# Patient Record
Sex: FEMALE | Race: WHITE | NOT HISPANIC OR LATINO | URBAN - NONMETROPOLITAN AREA
[De-identification: names, ages, dates, MRNs, and addresses within clinical notes are randomized per-mention and may not be internally consistent; named-entity substitution may affect disease eponyms.]

---

## 2017-11-21 ENCOUNTER — TRANSCRIBE ORDERS (OUTPATIENT)
Dept: ADMINISTRATIVE | Facility: HOSPITAL | Age: 67
End: 2017-11-21

## 2017-11-21 DIAGNOSIS — Z78.0 POST-MENOPAUSAL: Primary | ICD-10-CM

## 2017-11-27 ENCOUNTER — TRANSCRIBE ORDERS (OUTPATIENT)
Dept: GENERAL RADIOLOGY | Facility: HOSPITAL | Age: 67
End: 2017-11-27

## 2021-11-18 ENCOUNTER — TRANSCRIBE ORDERS (OUTPATIENT)
Dept: ADMINISTRATIVE | Facility: HOSPITAL | Age: 71
End: 2021-11-18

## 2021-11-18 DIAGNOSIS — Z12.31 ENCOUNTER FOR SCREENING MAMMOGRAM FOR MALIGNANT NEOPLASM OF BREAST: Primary | ICD-10-CM

## 2022-01-18 ENCOUNTER — HOSPITAL ENCOUNTER (OUTPATIENT)
Dept: MAMMOGRAPHY | Facility: HOSPITAL | Age: 72
Discharge: HOME | End: 2022-01-18
Admitting: FAMILY MEDICINE

## 2022-01-18 DIAGNOSIS — Z12.31 ENCOUNTER FOR SCREENING MAMMOGRAM FOR MALIGNANT NEOPLASM OF BREAST: ICD-10-CM

## 2022-01-18 PROCEDURE — 77067 SCR MAMMO BI INCL CAD: CPT

## 2022-01-18 PROCEDURE — 77063 BREAST TOMOSYNTHESIS BI: CPT

## 2022-06-07 ENCOUNTER — HOSPITAL ENCOUNTER (OUTPATIENT)
Dept: GENERAL RADIOLOGY | Facility: HOSPITAL | Age: 72
Discharge: HOME | End: 2022-06-07
Admitting: FAMILY MEDICINE

## 2022-06-07 DIAGNOSIS — M79.672 FOOT PAIN, LEFT: ICD-10-CM

## 2022-06-07 PROCEDURE — 73630 X-RAY EXAM OF FOOT: CPT

## 2022-12-06 ENCOUNTER — TRANSCRIBE ORDERS (OUTPATIENT)
Dept: ADMINISTRATIVE | Facility: HOSPITAL | Age: 72
End: 2022-12-06

## 2022-12-06 DIAGNOSIS — Z78.0 POSTMENOPAUSAL STATE: Primary | ICD-10-CM

## 2023-02-04 ENCOUNTER — HOSPITAL ENCOUNTER (EMERGENCY)
Facility: HOSPITAL | Age: 73
Discharge: HOME | End: 2023-02-04
Attending: EMERGENCY MEDICINE | Admitting: EMERGENCY MEDICINE
Payer: MEDICARE

## 2023-02-04 ENCOUNTER — APPOINTMENT (OUTPATIENT)
Dept: CT IMAGING | Facility: HOSPITAL | Age: 73
End: 2023-02-04
Payer: MEDICARE

## 2023-02-04 ENCOUNTER — APPOINTMENT (OUTPATIENT)
Dept: GENERAL RADIOLOGY | Facility: HOSPITAL | Age: 73
End: 2023-02-04
Payer: MEDICARE

## 2023-02-04 VITALS
BODY MASS INDEX: 35.17 KG/M2 | DIASTOLIC BLOOD PRESSURE: 75 MMHG | WEIGHT: 206 LBS | HEIGHT: 64 IN | OXYGEN SATURATION: 99 % | TEMPERATURE: 98 F | SYSTOLIC BLOOD PRESSURE: 172 MMHG | RESPIRATION RATE: 20 BRPM | HEART RATE: 86 BPM

## 2023-02-04 DIAGNOSIS — R11.2 NAUSEA AND VOMITING, UNSPECIFIED VOMITING TYPE: ICD-10-CM

## 2023-02-04 DIAGNOSIS — R42 VERTIGO: Primary | ICD-10-CM

## 2023-02-04 LAB
ALBUMIN SERPL-MCNC: 4 G/DL (ref 3.5–5.2)
ALBUMIN/GLOB SERPL: 1.3 G/DL
ALP SERPL-CCNC: 72 U/L (ref 39–117)
ALT SERPL W P-5'-P-CCNC: 16 U/L (ref 1–33)
ANION GAP SERPL CALCULATED.3IONS-SCNC: 9.8 MMOL/L (ref 5–15)
AST SERPL-CCNC: 15 U/L (ref 1–32)
BASOPHILS # BLD AUTO: 0.11 10*3/MM3 (ref 0–0.2)
BASOPHILS NFR BLD AUTO: 0.9 % (ref 0–1.5)
BILIRUB SERPL-MCNC: 0.6 MG/DL (ref 0–1.2)
BILIRUB UR QL STRIP: NEGATIVE
BUN SERPL-MCNC: 12 MG/DL (ref 8–23)
BUN/CREAT SERPL: 17.9 (ref 7–25)
CALCIUM SPEC-SCNC: 9.8 MG/DL (ref 8.6–10.5)
CHLORIDE SERPL-SCNC: 103 MMOL/L (ref 98–107)
CLARITY UR: CLEAR
CO2 SERPL-SCNC: 29.2 MMOL/L (ref 22–29)
COLOR UR: YELLOW
CREAT SERPL-MCNC: 0.67 MG/DL (ref 0.57–1)
DEPRECATED RDW RBC AUTO: 40.4 FL (ref 37–54)
EGFRCR SERPLBLD CKD-EPI 2021: 93.6 ML/MIN/1.73
EOSINOPHIL # BLD AUTO: 0.13 10*3/MM3 (ref 0–0.4)
EOSINOPHIL NFR BLD AUTO: 1.1 % (ref 0.3–6.2)
ERYTHROCYTE [DISTWIDTH] IN BLOOD BY AUTOMATED COUNT: 12.3 % (ref 12.3–15.4)
GLOBULIN UR ELPH-MCNC: 3.1 GM/DL
GLUCOSE SERPL-MCNC: 105 MG/DL (ref 65–99)
GLUCOSE UR STRIP-MCNC: NEGATIVE MG/DL
HCT VFR BLD AUTO: 45 % (ref 34–46.6)
HGB BLD-MCNC: 15.1 G/DL (ref 12–15.9)
HGB UR QL STRIP.AUTO: NEGATIVE
HOLD SPECIMEN: NORMAL
HOLD SPECIMEN: NORMAL
IMM GRANULOCYTES # BLD AUTO: 0.09 10*3/MM3 (ref 0–0.05)
IMM GRANULOCYTES NFR BLD AUTO: 0.7 % (ref 0–0.5)
KETONES UR QL STRIP: ABNORMAL
LEUKOCYTE ESTERASE UR QL STRIP.AUTO: NEGATIVE
LYMPHOCYTES # BLD AUTO: 2.88 10*3/MM3 (ref 0.7–3.1)
LYMPHOCYTES NFR BLD AUTO: 23.7 % (ref 19.6–45.3)
MAGNESIUM SERPL-MCNC: 2.2 MG/DL (ref 1.6–2.4)
MCH RBC QN AUTO: 29.7 PG (ref 26.6–33)
MCHC RBC AUTO-ENTMCNC: 33.6 G/DL (ref 31.5–35.7)
MCV RBC AUTO: 88.6 FL (ref 79–97)
MONOCYTES # BLD AUTO: 0.64 10*3/MM3 (ref 0.1–0.9)
MONOCYTES NFR BLD AUTO: 5.3 % (ref 5–12)
NEUTROPHILS NFR BLD AUTO: 68.3 % (ref 42.7–76)
NEUTROPHILS NFR BLD AUTO: 8.28 10*3/MM3 (ref 1.7–7)
NITRITE UR QL STRIP: NEGATIVE
NRBC BLD AUTO-RTO: 0 /100 WBC (ref 0–0.2)
PH UR STRIP.AUTO: 6 [PH] (ref 5–8)
PLATELET # BLD AUTO: 301 10*3/MM3 (ref 140–450)
PMV BLD AUTO: 9.7 FL (ref 6–12)
POTASSIUM SERPL-SCNC: 4.2 MMOL/L (ref 3.5–5.2)
PROT SERPL-MCNC: 7.1 G/DL (ref 6–8.5)
PROT UR QL STRIP: NEGATIVE
RBC # BLD AUTO: 5.08 10*6/MM3 (ref 3.77–5.28)
SODIUM SERPL-SCNC: 142 MMOL/L (ref 136–145)
SP GR UR STRIP: 1.02 (ref 1–1.03)
TROPONIN T SERPL-MCNC: <0.01 NG/ML (ref 0–0.03)
UROBILINOGEN UR QL STRIP: ABNORMAL
WBC NRBC COR # BLD: 12.13 10*3/MM3 (ref 3.4–10.8)
WHOLE BLOOD HOLD COAG: NORMAL
WHOLE BLOOD HOLD SPECIMEN: NORMAL

## 2023-02-04 PROCEDURE — 71045 X-RAY EXAM CHEST 1 VIEW: CPT

## 2023-02-04 PROCEDURE — 80053 COMPREHEN METABOLIC PANEL: CPT

## 2023-02-04 PROCEDURE — 99284 EMERGENCY DEPT VISIT MOD MDM: CPT

## 2023-02-04 PROCEDURE — 96374 THER/PROPH/DIAG INJ IV PUSH: CPT

## 2023-02-04 PROCEDURE — 85025 COMPLETE CBC W/AUTO DIFF WBC: CPT

## 2023-02-04 PROCEDURE — 25010000002 METOCLOPRAMIDE PER 10 MG: Performed by: PHYSICIAN ASSISTANT

## 2023-02-04 PROCEDURE — 36415 COLL VENOUS BLD VENIPUNCTURE: CPT

## 2023-02-04 PROCEDURE — 84484 ASSAY OF TROPONIN QUANT: CPT

## 2023-02-04 PROCEDURE — 25010000002 DIPHENHYDRAMINE PER 50 MG: Performed by: PHYSICIAN ASSISTANT

## 2023-02-04 PROCEDURE — 83735 ASSAY OF MAGNESIUM: CPT

## 2023-02-04 PROCEDURE — 96375 TX/PRO/DX INJ NEW DRUG ADDON: CPT

## 2023-02-04 PROCEDURE — 81003 URINALYSIS AUTO W/O SCOPE: CPT

## 2023-02-04 PROCEDURE — 70450 CT HEAD/BRAIN W/O DYE: CPT

## 2023-02-04 PROCEDURE — 93005 ELECTROCARDIOGRAM TRACING: CPT

## 2023-02-04 RX ORDER — ONDANSETRON 2 MG/ML
4 INJECTION INTRAMUSCULAR; INTRAVENOUS ONCE
Status: DISCONTINUED | OUTPATIENT
Start: 2023-02-04 | End: 2023-02-04

## 2023-02-04 RX ORDER — METOCLOPRAMIDE HYDROCHLORIDE 5 MG/ML
10 INJECTION INTRAMUSCULAR; INTRAVENOUS ONCE
Status: COMPLETED | OUTPATIENT
Start: 2023-02-04 | End: 2023-02-04

## 2023-02-04 RX ORDER — DIAZEPAM 5 MG/ML
2.5 INJECTION, SOLUTION INTRAMUSCULAR; INTRAVENOUS EVERY 4 HOURS PRN
Status: DISCONTINUED | OUTPATIENT
Start: 2023-02-04 | End: 2023-02-04

## 2023-02-04 RX ORDER — MECLIZINE HYDROCHLORIDE 25 MG/1
25 TABLET ORAL 3 TIMES DAILY PRN
Qty: 9 TABLET | Refills: 0 | Status: SHIPPED | OUTPATIENT
Start: 2023-02-04

## 2023-02-04 RX ORDER — SCOLOPAMINE TRANSDERMAL SYSTEM 1 MG/1
1 PATCH, EXTENDED RELEASE TRANSDERMAL
Status: DISCONTINUED | OUTPATIENT
Start: 2023-02-04 | End: 2023-02-04 | Stop reason: HOSPADM

## 2023-02-04 RX ORDER — ONDANSETRON 4 MG/1
4 TABLET, ORALLY DISINTEGRATING ORAL EVERY 6 HOURS PRN
Qty: 8 TABLET | Refills: 0 | Status: SHIPPED | OUTPATIENT
Start: 2023-02-04 | End: 2023-02-06

## 2023-02-04 RX ORDER — SODIUM CHLORIDE 0.9 % (FLUSH) 0.9 %
10 SYRINGE (ML) INJECTION AS NEEDED
Status: DISCONTINUED | OUTPATIENT
Start: 2023-02-04 | End: 2023-02-04 | Stop reason: HOSPADM

## 2023-02-04 RX ORDER — DIPHENHYDRAMINE HYDROCHLORIDE 50 MG/ML
25 INJECTION INTRAMUSCULAR; INTRAVENOUS ONCE
Status: COMPLETED | OUTPATIENT
Start: 2023-02-04 | End: 2023-02-04

## 2023-02-04 RX ADMIN — DIPHENHYDRAMINE HYDROCHLORIDE 25 MG: 50 INJECTION, SOLUTION INTRAMUSCULAR; INTRAVENOUS at 18:29

## 2023-02-04 RX ADMIN — SCOPALAMINE 1 PATCH: 1 PATCH, EXTENDED RELEASE TRANSDERMAL at 18:26

## 2023-02-04 RX ADMIN — METOCLOPRAMIDE 10 MG: 5 INJECTION, SOLUTION INTRAMUSCULAR; INTRAVENOUS at 18:28

## 2023-02-04 RX ADMIN — SODIUM CHLORIDE 1000 ML: 9 INJECTION, SOLUTION INTRAVENOUS at 18:25

## 2023-02-04 NOTE — ED PROVIDER NOTES
Subjective  History of Present Illness:    Chief Complaint: Dizziness, syncope  History of Present Illness: Patient is a 73-year-old female with a history of MI, anemia, arthritis, asthma, fatigue, fibromyalgia, dizziness, and headache presenting to the ER for evaluation of dizziness, vomiting and likely syncopal episode.  Patient states that she began feeling very dizzy last night and upon waking this morning had vomiting with the dizziness.  She states the dizziness is only present when she moves her head.  She states she bent over to pet her dog this morning and everything went black.  She states she was able to get herself down to the floor but does think she may have lost consciousness for short amount of time.  Does not believe she hit her head.  She states upon awakening she threw up in the kitchen sink.  She denies any other recent head trauma.  She denies any fever, chills, cough, chest pain, shortness of breath, dizziness, syncope, abdominal pain, diarrhea, dysuria, hematuria, leg pain or swelling, extremity weakness, or any other symptoms.  Onset: Today  Duration: Intermittent  Exacerbating / Alleviating factors: Only present with head movements  Associated symptoms: Nausea, vomiting      Nurses Notes reviewed and agree, including vitals, allergies, social history and prior medical history.     REVIEW OF SYSTEMS: All systems reviewed and not pertinent unless noted.  Review of Systems      Positive for: Dizziness, nausea, vomiting    Negative for: Headache, fever, chills, vision loss, chest pain, cough, shortness of breath, abdominal pain, diarrhea, extremity weakness, difficulty speaking or swallowing    Past Medical History:   Diagnosis Date   • Acute myocardial infarction (HCC)     history of   • Anemia    • Arthritis    • Asthma    • Back pain     History of   • Difficulty walking     History of   • Fatigue    • Fibromyalgia    • H/O measles    • H/O prostate cancer    • History of diarrhea    •  "History of dizziness    • History of headache    • History of hemorrhoids    • History of hoarseness    • History of neck pain    • Joint pain     history of   • Limb pain     h/o   • Normal routine physical examination    • Pain in axilla     History of   • Rash     History of   • Seasonal allergic reaction      History of Complaint Of Allergic Reaction Seasonal   • Swelling of ankle joint        Allergies:    Doxycycline, Dust mite extract, Meloxicam, Other, Penicillins, Sulfa antibiotics, and Latex      Past Surgical History:   Procedure Laterality Date   • CHOLECYSTECTOMY     • KNEE SURGERY Bilateral    • WRIST SURGERY           Social History     Socioeconomic History   • Marital status:    Tobacco Use   • Smoking status: Never   • Smokeless tobacco: Never   Vaping Use   • Vaping Use: Never used   Substance and Sexual Activity   • Alcohol use: No   • Drug use: No   • Sexual activity: Defer         History reviewed. No pertinent family history.    Objective  Physical Exam:  BP (!) 185/84   Pulse 70   Temp 98 °F (36.7 °C) (Oral)   Resp 20   Ht 162.6 cm (64\")   Wt 93.4 kg (206 lb)   SpO2 99%   BMI 35.36 kg/m²      Physical Exam    CONSTITUTIONAL: Well developed, nontoxic appearing, no acute distress.  She is awake, alert, answering questions appropriately.  VITAL SIGNS: per nursing, reviewed and noted  SKIN: exposed skin with no rashes, ulcerations or petechiae  EYES: Grossly EOMI, no icterus, PERRL. No significant nystagmus  ENT: Normal voice.  Posterior pharynx unremarkable, uvula midline, tympanic membranes are clear bilaterally, no erythema or bulging  RESPIRATORY:  No increased work of breathing. No retractions.  Lung sounds are clear to auscultation bilaterally  CARDIOVASCULAR:  regular rate and rhythm. Good Peripheral pulses. Good cap refill to extremities.   GI: Abdomen soft, nontender, normal bowel sounds.  MUSCULOSKELETAL:  No tenderness. Full ROM. Strength and tone grossly normal. "   NEUROLOGIC: Alert, oriented x 3. No gross deficits.  Strength 5 out of 5 in upper and lower extremities.  GCS 15.  No obvious ataxia or dysmetria  PSYCH: appropriate affect.      Procedures    ED Course:        ED Course as of 02/04/23 1926   Sat Feb 04, 2023 1839 FINAL REPORT     TECHNIQUE:  Multiple axial CT images were performed from the foramen magnum  to the vertex. This study was performed with techniques to keep  radiation doses as low as reasonably achievable (ALARA).  Individualized dose reduction techniques using automated  exposure control or adjustment of mA and/or kV according to the  patient's size were employed.     CLINICAL HISTORY:  Dizziness, vomiting     FINDINGS:  No acute intracranial hemorrhage or large acute cortical  infarct.  The brain volume is normal for patient's age.  Ventricles are normal in size and configuration.  No midline  shift.  The basal cisterns are patent.  No skull fracture.  The  visualized paranasal sinuses and mastoid air cells are clear.     IMPRESSION:  No acute intracranial hemorrhage or large acute cortical infarct.     Authenticated and Electronically Signed by Manjit Weber MD on  02/04/2023 06:30:39 PM [LA]   1843 Color, UA: Yellow [LA]   1843 Appearance, UA: Clear [LA]   1843 pH, UA: 6.0 [LA]   1843 Specific Gravity, UA: 1.016 [LA]   1843 Glucose: Negative [LA]   1843 Ketones, UA(!): Trace [LA]   1843 Bilirubin, UA: Negative [LA]   1843 Blood, UA: Negative [LA]   1843 Protein, UA: Negative [LA]   1843 Leukocytes, UA: Negative [LA]   1843 Nitrite, UA: Negative [LA]   1843 Urobilinogen, UA: 0.2 E.U./dL [LA]   1846 Reviewed xray, do not see acute cardiopulmonary abnormalities.  [LA]   1908 Patient states she feels much better.  She states that she needs to leave and wants to go home at this time.  She does not want to get the rest of her IV fluid bolus.  She ambulated to the bathroom without difficulty, no ataxia or gait abnormalities..  We will call in  medications for her to use at home, give ENT follow-up.  Discussed very strict return precautions. [LA]      ED Course User Index  [LA] Alea Littlejohn PA-C       Lab Results (last 24 hours)     Procedure Component Value Units Date/Time    CBC & Differential [[id number]]  (Abnormal) Collected: 02/04/23 1724    Specimen: Blood Updated: 02/04/23 1731    Narrative:      The following orders were created for panel order CBC & Differential.  Procedure                               Abnormality         Status                     ---------                               -----------         ------                     CBC Auto Differential[[id number]]        Abnormal            Final result                 Please view results for these tests on the individual orders.    Comprehensive Metabolic Panel [[id number]]  (Abnormal) Collected: 02/04/23 1724    Specimen: Blood Updated: 02/04/23 1748     Glucose 105 mg/dL      BUN 12 mg/dL      Creatinine 0.67 mg/dL      Sodium 142 mmol/L      Potassium 4.2 mmol/L      Chloride 103 mmol/L      CO2 29.2 mmol/L      Calcium 9.8 mg/dL      Total Protein 7.1 g/dL      Albumin 4.0 g/dL      ALT (SGPT) 16 U/L      AST (SGOT) 15 U/L      Alkaline Phosphatase 72 U/L      Total Bilirubin 0.6 mg/dL      Globulin 3.1 gm/dL      A/G Ratio 1.3 g/dL      BUN/Creatinine Ratio 17.9     Anion Gap 9.8 mmol/L      eGFR 93.6 mL/min/1.73     Narrative:      GFR Normal >60  Chronic Kidney Disease <60  Kidney Failure <15    The GFR formula is only valid for adults with stable renal function between ages 18 and 70.    Troponin [[id number]]  (Normal) Collected: 02/04/23 1724    Specimen: Blood Updated: 02/04/23 1750     Troponin T <0.010 ng/mL     Narrative:      Troponin T Reference Range:  <= 0.03 ng/mL-   Negative for AMI  >0.03 ng/mL-     Abnormal for myocardial necrosis.  Clinicians would have to utilize clinical acumen, EKG, Troponin and serial changes to determine if it is an Acute Myocardial Infarction or  myocardial injury due to an underlying chronic condition.       Results may be falsely decreased if patient taking Biotin.      Magnesium [[id number]]  (Normal) Collected: 02/04/23 1724    Specimen: Blood Updated: 02/04/23 1748     Magnesium 2.2 mg/dL     CBC Auto Differential [[id number]]  (Abnormal) Collected: 02/04/23 1724    Specimen: Blood Updated: 02/04/23 1731     WBC 12.13 10*3/mm3      RBC 5.08 10*6/mm3      Hemoglobin 15.1 g/dL      Hematocrit 45.0 %      MCV 88.6 fL      MCH 29.7 pg      MCHC 33.6 g/dL      RDW 12.3 %      RDW-SD 40.4 fl      MPV 9.7 fL      Platelets 301 10*3/mm3      Neutrophil % 68.3 %      Lymphocyte % 23.7 %      Monocyte % 5.3 %      Eosinophil % 1.1 %      Basophil % 0.9 %      Immature Grans % 0.7 %      Neutrophils, Absolute 8.28 10*3/mm3      Lymphocytes, Absolute 2.88 10*3/mm3      Monocytes, Absolute 0.64 10*3/mm3      Eosinophils, Absolute 0.13 10*3/mm3      Basophils, Absolute 0.11 10*3/mm3      Immature Grans, Absolute 0.09 10*3/mm3      nRBC 0.0 /100 WBC     Urinalysis With Culture If Indicated - Urine, Clean Catch [[id number]]  (Abnormal) Collected: 02/04/23 1825    Specimen: Urine, Clean Catch Updated: 02/04/23 1840     Color, UA Yellow     Appearance, UA Clear     pH, UA 6.0     Specific Gravity, UA 1.016     Glucose, UA Negative     Ketones, UA Trace     Bilirubin, UA Negative     Blood, UA Negative     Protein, UA Negative     Leuk Esterase, UA Negative     Nitrite, UA Negative     Urobilinogen, UA 0.2 E.U./dL    Narrative:      In absence of clinical symptoms, the presence of pyuria, bacteria, and/or nitrites on the urinalysis result does not correlate with infection.  Urine microscopic not indicated.           CT Head Without Contrast    Result Date: 2/4/2023  FINAL REPORT TECHNIQUE: Multiple axial CT images were performed from the foramen magnum to the vertex. This study was performed with techniques to keep radiation doses as low as reasonably achievable (ALARA).  Individualized dose reduction techniques using automated exposure control or adjustment of mA and/or kV according to the patient's size were employed. CLINICAL HISTORY: Dizziness, vomiting FINDINGS: No acute intracranial hemorrhage or large acute cortical infarct.  The brain volume is normal for patient's age. Ventricles are normal in size and configuration.  No midline shift.  The basal cisterns are patent.  No skull fracture.  The visualized paranasal sinuses and mastoid air cells are clear.     Impression: No acute intracranial hemorrhage or large acute cortical infarct. Authenticated and Electronically Signed by Manjit Weber MD on 02/04/2023 06:30:39 PM         MDM    Initial impression of presenting illness: Patient is a 73-year-old female presenting to the ER for evaluation of dizziness and vomiting.  On arrival, patient has an elevated blood pressure but there is no tachycardia, she is afebrile.  She does not appear septic or toxic    DDX: includes but is not limited to: Benign positional vertigo, intracranial abnormality, electrolyte abnormalities, dehydration, cardiac dysrhythmia.  Patient denies any chest pain or shortness of breath concerning for ACS, pulmonary embolism.  She has no focal neurological deficits concerning for CVA.  Symptoms are only present with movement which leads me to believe this is more of a vertigo presentation    Patient arrives in stable condition with vitals interpreted by myself.     Pertinent features from physical exam: No focal neurological deficits noted, no obvious nystagmus with patient does have dizziness elicited when she looks to the left or moves her head.    Initial diagnostic plan: Obtain basic labs, magnesium, troponin, EKG, chest x-ray, CT of the head.  Will initiate IV fluids.  We will start by giving Compazine, Benadryl scopolamine patch to help with what is likely vertigo.  May give low-dose Valium if needed.    Results from initial plan were reviewed and  interpreted by me revealing mild leukocytosis of 12, no anemia.  There were no significant Beth abnormalities.  Urine had trace ketones with no signs of infection.  Magnesium was within normal limits.  There is no elevation of troponin.  EKG was reviewed by attending.  CT scan was read by radiology with no acute abnormalities.  Chest x-ray reviewed with attending, no acute cardiopulmonary normalities.    Diagnostic information from other sources: Chart review    Interventions / Re-evaluation: On reassessment, patient felt better.  She tells me that she is ready to go home and wants to leave at this time.  She was able to ambulate to the bathroom without ataxia or gait abnormalities.    Results/clinical rationale were discussed with patient and friend at bedside.  We will call and medicines for vertigo and nausea.  Discussed follow-up with primary care provider and very strict return precautions to the ER.  Discussed she may need to see ear nose and throat if symptoms persist, will give contact information.  She verbalized understanding and was in agreement with this plan of care    Consultations/Discussion of results with other physicians: Dr. Levy    Disposition plan: Discharge  -----    Final diagnoses:   Vertigo   Nausea and vomiting, unspecified vomiting type        Alea Littlejohn PA-C  02/04/23 1929

## 2023-02-05 NOTE — DISCHARGE INSTRUCTIONS
You likely have vertigo based on your symptoms.  Make sure you are drinking plenty of fluids to stay well-hydrated.  You can take meclizine as needed for dizziness, Zofran as needed for nausea and vomiting.  You will need to follow-up with your primary care provider as early as possible to reevaluate symptoms and ensure you are improving. They may need to perform additional outpatient workup. If vertigo persists, may need to see ear nose and throat for further evaluation.  Return here to the ER for any change, worsening of symptoms, or any additional concerns including but not limited to severe or persistent dizziness, gait abnormalities, extremity weakness, severe headache, fever greater than 100.4, intractable vomiting, chest pain, shortness of breath, syncopal episodes.

## 2023-11-23 VITALS
DIASTOLIC BLOOD PRESSURE: 95 MMHG | RESPIRATION RATE: 20 BRPM | WEIGHT: 215 LBS | OXYGEN SATURATION: 100 % | BODY MASS INDEX: 36.7 KG/M2 | HEART RATE: 100 BPM | SYSTOLIC BLOOD PRESSURE: 139 MMHG | HEIGHT: 64 IN | TEMPERATURE: 97.6 F

## 2023-11-23 PROCEDURE — 99284 EMERGENCY DEPT VISIT MOD MDM: CPT

## 2023-11-24 ENCOUNTER — HOSPITAL ENCOUNTER (EMERGENCY)
Facility: HOSPITAL | Age: 73
Discharge: HOME | End: 2023-11-24
Attending: EMERGENCY MEDICINE
Payer: MEDICARE

## 2023-11-24 DIAGNOSIS — K59.03 DRUG-INDUCED CONSTIPATION: Primary | ICD-10-CM

## 2023-11-24 DIAGNOSIS — R11.0 NAUSEA: ICD-10-CM

## 2023-11-24 PROCEDURE — 63710000001 ONDANSETRON ODT 4 MG TABLET DISPERSIBLE: Performed by: EMERGENCY MEDICINE

## 2023-11-24 RX ORDER — ONDANSETRON 4 MG/1
8 TABLET, ORALLY DISINTEGRATING ORAL ONCE
Status: COMPLETED | OUTPATIENT
Start: 2023-11-24 | End: 2023-11-24

## 2023-11-24 RX ORDER — ONDANSETRON 4 MG/1
4 TABLET, ORALLY DISINTEGRATING ORAL EVERY 8 HOURS PRN
Qty: 12 TABLET | Refills: 0 | Status: SHIPPED | OUTPATIENT
Start: 2023-11-24

## 2023-11-24 RX ORDER — CELECOXIB 200 MG/1
1 CAPSULE ORAL DAILY
COMMUNITY
Start: 2023-11-20

## 2023-11-24 RX ORDER — PROMETHAZINE HYDROCHLORIDE 12.5 MG/1
12.5 TABLET ORAL EVERY 6 HOURS PRN
COMMUNITY
Start: 2023-11-21

## 2023-11-24 RX ORDER — OXYCODONE HYDROCHLORIDE AND ACETAMINOPHEN 5; 325 MG/1; MG/1
1 TABLET ORAL EVERY 6 HOURS
COMMUNITY
Start: 2023-11-20

## 2023-11-24 RX ORDER — APIXABAN 2.5 MG/1
1 TABLET, FILM COATED ORAL EVERY 12 HOURS SCHEDULED
COMMUNITY
Start: 2023-11-20

## 2023-11-24 RX ADMIN — ONDANSETRON 8 MG: 4 TABLET, ORALLY DISINTEGRATING ORAL at 00:34

## 2023-11-24 NOTE — ED PROVIDER NOTES
Subjective  History of Present Illness:    Chief Complaint: Nausea and constipation    History of Present Illness: 73-year-old female presents with nausea, constipation status post knee replacement, has been using suppositories, and laxatives with no relief.    Nurses Notes reviewed and agree, including vitals, allergies, social history and prior medical history.     REVIEW OF SYSTEMS: All systems reviewed and not pertinent unless noted.  Review of Systems      Positive for: Nausea and constipation, reports abdominal cramping    Negative for: Fever GI bleeding    Past Medical History:   Diagnosis Date    Acute myocardial infarction     history of    Anemia     Arthritis     Asthma     Back pain     History of    Difficulty walking     History of    Fatigue     Fibromyalgia     H/O measles     H/O prostate cancer     History of diarrhea     History of dizziness     History of headache     History of hemorrhoids     History of hoarseness     History of neck pain     Joint pain     history of    Limb pain     h/o    Normal routine physical examination     Pain in axilla     History of    Rash     History of    Seasonal allergic reaction      History of Complaint Of Allergic Reaction Seasonal    Swelling of ankle joint        Allergies:    Doxycycline, Dust mite extract, Latex, Meloxicam, Other, Penicillins, and Sulfa antibiotics      Past Surgical History:   Procedure Laterality Date    CHOLECYSTECTOMY      KNEE SURGERY Bilateral     WRIST SURGERY           Social History     Socioeconomic History    Marital status:    Tobacco Use    Smoking status: Never    Smokeless tobacco: Never   Vaping Use    Vaping Use: Never used   Substance and Sexual Activity    Alcohol use: No    Drug use: No    Sexual activity: Defer         History reviewed. No pertinent family history.    Objective  Physical Exam:  /95 (BP Location: Left arm, Patient Position: Sitting)   Pulse 100   Temp 97.6 °F (36.4 °C) (Oral)   Resp 20   " Ht 162.6 cm (64\")   Wt 97.5 kg (215 lb)   SpO2 100%   BMI 36.90 kg/m²      Physical Exam    CONSTITUTIONAL: Well developed, nontoxic obese 73-year-old female,  in no acute distress.  VITAL SIGNS: per nursing, reviewed and noted  SKIN: exposed skin with no rashes, ulcerations or petechiae  EYES: Grossly EOMI, no icterus  ENT: Normal voice.  Moist mucous membranes   RESPIRATORY:  No increased work of breathing. No retractions.   CARDIOVASCULAR:   Extremities pink and warm.  Good cap refill to extremities.   GI: Abdomen mild diffuse tenderness.  RN chaperoned rectal examination normal rectal tone small amount of hard stool in the rectal vault.  No thrombosed hemorrhoids  MUSCULOSKELETAL: Age-appropriate bulk and tone, moves all 4 extremities  NEUROLOGIC: Alert, oriented x 3. No gross deficits. GCS 15.   PSYCH: appropriate affect.  : no bladder tenderness or distention, no CVA tenderness    Procedures    ED Course:    Lab Results (last 24 hours)       ** No results found for the last 24 hours. **             No radiology results from the last 24 hrs     MDM           Medical Decision Making:    Initial impression of presenting illness:  73-year-old female presents with nausea, constipation status post knee replacement, has been using suppositories, and laxatives with no relief.    DDX: includes but is not limited to: Fecal impaction, drug-induced constipation         Patient arrives normotensive afebrile heart rate 100 sats 100% room air with vitals interpreted by myself.     Pertinent features from physical exam: Normal rectal tone small amount of firm stool in the rectal vault tan stool without evidence of gross GI bleed.    Initial diagnostic plan: I given minimal stool in the rectal vault will do an enema,    Diagnostic information from other sources: Family member at the bedside    Interventions / Re-evaluation: Enema, Zofran, nausea resolved with Zofran.  Large BM after enema.    Results/clinical rationale " were discussed with patient    Consultations/Discussion of results with other physicians: None    Disposition plan: Discharge  -----  Will send prescription for modified GoLytely in regards to chronic constipation dosing.  Provided instructions on after visit summary.  Prescription Zofran    Final diagnoses:   Drug-induced constipation            Rafiq Tellez, DO  11/24/23 0201

## 2023-11-24 NOTE — DISCHARGE INSTRUCTIONS
Do not mix the GoLYTELY solution like you want to have colonoscopy. Do not follow that instruction.  Remove the powder inside the bottle of GoLYTELY Each bottle contains enough powder for about 16 doses.  Put the powder in a clean and dry container.  If your bottle comes with artificial favor packets, mix the powder with the GoLYTELY powder for flavor.  If your bottle does not come with artificial flavor, you may add one packet of Crystal Light Lemonade mix powder to the GoLYTELY powder for flavor.  Each day, dissolve 1 tablespoon of the GoLYTELY powder in an 8 ounce glass   of water. Refrigerate to chill.  Drink the glass of GoLYTELY solution every day (preferably morning). Follow   this with 1-2 glasses of water or fluid.  You may need less or you may need more. 1-2 glasses of GoLYTELY solution is often enough.  You may continue your other medications and supplements with this   regimen.  This regimen is usually used for a short period of time.  Always follow the instruction of your physician.